# Patient Record
Sex: MALE | Race: WHITE
[De-identification: names, ages, dates, MRNs, and addresses within clinical notes are randomized per-mention and may not be internally consistent; named-entity substitution may affect disease eponyms.]

---

## 2019-02-21 PROBLEM — Z00.00 ENCOUNTER FOR PREVENTIVE HEALTH EXAMINATION: Status: ACTIVE | Noted: 2019-02-21

## 2021-02-16 ENCOUNTER — APPOINTMENT (OUTPATIENT)
Dept: PHYSICAL MEDICINE AND REHAB | Facility: CLINIC | Age: 81
End: 2021-02-16
Payer: MEDICARE

## 2021-02-16 VITALS
HEART RATE: 77 BPM | SYSTOLIC BLOOD PRESSURE: 134 MMHG | DIASTOLIC BLOOD PRESSURE: 81 MMHG | BODY MASS INDEX: 25.61 KG/M2 | HEIGHT: 64 IN | WEIGHT: 150 LBS

## 2021-02-16 DIAGNOSIS — Z80.9 FAMILY HISTORY OF MALIGNANT NEOPLASM, UNSPECIFIED: ICD-10-CM

## 2021-02-16 DIAGNOSIS — Z85.9 PERSONAL HISTORY OF MALIGNANT NEOPLASM, UNSPECIFIED: ICD-10-CM

## 2021-02-16 DIAGNOSIS — Z83.3 FAMILY HISTORY OF DIABETES MELLITUS: ICD-10-CM

## 2021-02-16 DIAGNOSIS — Z78.9 OTHER SPECIFIED HEALTH STATUS: ICD-10-CM

## 2021-02-16 PROCEDURE — 99203 OFFICE O/P NEW LOW 30 MIN: CPT

## 2021-02-16 PROCEDURE — 72100 X-RAY EXAM L-S SPINE 2/3 VWS: CPT

## 2021-02-16 RX ORDER — TAMSULOSIN HYDROCHLORIDE 0.4 MG/1
0.4 CAPSULE ORAL
Qty: 90 | Refills: 0 | Status: ACTIVE | COMMUNITY
Start: 2021-01-24

## 2021-02-16 RX ORDER — CEPHALEXIN 500 MG/1
500 CAPSULE ORAL
Qty: 28 | Refills: 0 | Status: DISCONTINUED | COMMUNITY
Start: 2020-09-15

## 2021-02-16 NOTE — DATA REVIEWED
[Plain X-Rays] : plain X-Rays [FreeTextEntry1] : X-rays (2 views) L-spine taken at today's office visit: c/w Gr. I anterolisthesis L4-5 w/ facet OA

## 2021-02-16 NOTE — ASSESSMENT
[FreeTextEntry1] : 80 y.o. 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  X-rays L-spine reviewed in office.   Advised short course of OTC NSAIDs.  Rx P.T. for modalities, gentle ROM, stretching and strengthening exercises.  May consider MRI L-spine if patient is unable to advance his rehab program.  RTC 6-8 weeks.

## 2021-02-16 NOTE — HISTORY OF PRESENT ILLNESS
[FreeTextEntry1] : 80 y.o. M w/ h/o prostate cancer (2011-12) s/p RTX presents to office w/ c/o LBP.  Sxs first began 2-3 years ago as sciatica which was helped by chiro.  Pt. describes primarily axial right sided LBP now w/o distal radiation down leg.  Denies N/T/W in leg or foot.  No recent spinal imaging.  Pt. returned to chiro.  No P.T.  No NSAIDs.  No LESI.

## 2021-02-16 NOTE — PHYSICAL EXAM
[FreeTextEntry1] : NAD\par A&Ox3\par Non-obese\par Inspection: decreased lumbar lordosis\par ROM L-spine: 1/2 full forward flexion; 10' passive extension\par ROM Hips: smooth IR/ER w/o pain\par Pelvic tilt: slight\par Seated slump test: neg\par SLR: neg\par SHAUNA's: neg\par DTR's: symmetric knees; absent ankles\par MMT: 5/5 b/l LE\par Sensation: SILT\par Toe & Heel Walk: Yes\par Palpation: no TPs\par

## 2021-04-20 ENCOUNTER — APPOINTMENT (OUTPATIENT)
Dept: PHYSICAL MEDICINE AND REHAB | Facility: CLINIC | Age: 81
End: 2021-04-20
Payer: MEDICARE

## 2021-04-20 VITALS
HEIGHT: 64 IN | DIASTOLIC BLOOD PRESSURE: 80 MMHG | RESPIRATION RATE: 14 BRPM | WEIGHT: 150 LBS | TEMPERATURE: 97 F | SYSTOLIC BLOOD PRESSURE: 139 MMHG | BODY MASS INDEX: 25.61 KG/M2 | HEART RATE: 64 BPM | OXYGEN SATURATION: 99 %

## 2021-04-20 DIAGNOSIS — G89.29 LOW BACK PAIN: ICD-10-CM

## 2021-04-20 DIAGNOSIS — M54.5 LOW BACK PAIN: ICD-10-CM

## 2021-04-20 PROCEDURE — 99213 OFFICE O/P EST LOW 20 MIN: CPT

## 2021-04-20 NOTE — ASSESSMENT
[FreeTextEntry1] : 80 y.o. M w/ acute exacerbation of lumbar DDD/spondylosis (L4-5 spondy -> likely stenosis) w/o prominent radicular or myelopathic sxs - clinically improved.  X-rays L-spine reviewed in office again w/ patient.   No need for MRI now.  Advised adherence w/ HEP.   RTC PRN.

## 2021-04-20 NOTE — HISTORY OF PRESENT ILLNESS
[FreeTextEntry1] : 80 y.o. M w/ acute exacerbation of lumbar DDD/spondylosis (L4-5 spondy -> likely stenosis) w/o prominent radicular or myelopathic sxs returns to office for f/u.  Pt. states that his LBP has settled down and has not bothered him since.  He walks ad shola w/o AD.  Pt. finished his course of P.T. and is compliant with his HEP.  Not taking any NSAIDs.

## 2022-10-15 ENCOUNTER — EMERGENCY (EMERGENCY)
Facility: HOSPITAL | Age: 82
LOS: 0 days | Discharge: HOME | End: 2022-10-15
Attending: STUDENT IN AN ORGANIZED HEALTH CARE EDUCATION/TRAINING PROGRAM
Payer: MEDICARE

## 2022-10-15 VITALS
DIASTOLIC BLOOD PRESSURE: 78 MMHG | TEMPERATURE: 98 F | RESPIRATION RATE: 18 BRPM | OXYGEN SATURATION: 97 % | WEIGHT: 149.91 LBS | HEART RATE: 71 BPM | HEIGHT: 64 IN | SYSTOLIC BLOOD PRESSURE: 123 MMHG
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
## 2022-10-15 DIAGNOSIS — S09.90XA UNSPECIFIED INJURY OF HEAD, INITIAL ENCOUNTER: ICD-10-CM
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
## 2022-10-15 DIAGNOSIS — Z23 ENCOUNTER FOR IMMUNIZATION: ICD-10-CM
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
## 2022-10-15 DIAGNOSIS — S00.91XA ABRASION OF UNSPECIFIED PART OF HEAD, INITIAL ENCOUNTER: ICD-10-CM

## 2022-10-15 PROCEDURE — 72125 CT NECK SPINE W/O DYE: CPT | Mod: MA

## 2022-10-15 PROCEDURE — 99285 EMERGENCY DEPT VISIT HI MDM: CPT | Mod: 25

## 2022-10-15 PROCEDURE — 93005 ELECTROCARDIOGRAM TRACING: CPT

## 2022-10-15 PROCEDURE — 99284 EMERGENCY DEPT VISIT MOD MDM: CPT

## 2022-10-15 PROCEDURE — 70450 CT HEAD/BRAIN W/O DYE: CPT | Mod: 26,MA

## 2022-10-15 PROCEDURE — 90471 IMMUNIZATION ADMIN: CPT

## 2022-10-15 PROCEDURE — 82962 GLUCOSE BLOOD TEST: CPT

## 2022-10-15 PROCEDURE — 70450 CT HEAD/BRAIN W/O DYE: CPT | Mod: MA

## 2022-10-15 PROCEDURE — 90715 TDAP VACCINE 7 YRS/> IM: CPT

## 2022-10-15 PROCEDURE — 93010 ELECTROCARDIOGRAM REPORT: CPT

## 2022-10-15 PROCEDURE — 72125 CT NECK SPINE W/O DYE: CPT | Mod: 26,MA

## 2022-10-15 RX ORDER — TETANUS TOXOID, REDUCED DIPHTHERIA TOXOID AND ACELLULAR PERTUSSIS VACCINE, ADSORBED 5; 2.5; 8; 8; 2.5 [IU]/.5ML; [IU]/.5ML; UG/.5ML; UG/.5ML; UG/.5ML
0.5 SUSPENSION INTRAMUSCULAR ONCE
Refills: 0 | Status: COMPLETED | OUTPATIENT
Start: 2022-10-15 | End: 2022-10-15

## 2022-10-15 RX ADMIN — TETANUS TOXOID, REDUCED DIPHTHERIA TOXOID AND ACELLULAR PERTUSSIS VACCINE, ADSORBED 0.5 MILLILITER(S): 5; 2.5; 8; 8; 2.5 SUSPENSION INTRAMUSCULAR at 17:37

## 2022-10-15 NOTE — ED PROVIDER NOTE - PROGRESS NOTE DETAILS
Mavis DO: wound cleansed; patient ambulated with steady gait; instructed to f/u with pmd; strict return precautions given.

## 2022-10-15 NOTE — ED ADULT TRIAGE NOTE - CHIEF COMPLAINT QUOTE
Pt BIBEMS s/p fall. Pt was playing pickle ball when he fell backwards hitting his head on the ground. Pt noted to have an abrasion to the back of his head. C Collar placed by EMS. Pt has no complaints at this time. Unsure LOC at the time of the event. Pt denies blood thinner use. NA called.

## 2022-10-15 NOTE — ED ADULT NURSE NOTE - OBJECTIVE STATEMENT
Patient presents to the emergency room with complaints of fall. Patient stated he was playing pickle ball when he fell backwards hitting his head on the ground. Patient with abrasion to the back of his head. C Collar placed by EMS. Patient awake, alert, denies pain, denies blood thinner use, unknown LOC at time of fall.

## 2022-10-15 NOTE — ED PROVIDER NOTE - OBJECTIVE STATEMENT
82 year old male presents to the ED BIBEMS as a neuro alert s/p fall. Patient was playing pickle ball, lost balance and fell backwards, hitting head on the ground. Pt has not ambulated since fall. + abrasion on back of head, unsure of LOC. Denies AC use. Denies dizziness/lightheadedness prior to fall or currently. Denies back pain, chest pain, leg pain, or any other complaints. 82 year old male presents to the ED BIBEMS s/p fall. Patient was playing pickle ball, lost balance and fell backwards, hitting head on the ground. Pt has not ambulated since fall. + abrasion on back of head, unsure of LOC. Denies AC use. Denies dizziness/lightheadedness prior to fall or currently. Denies back pain, chest pain, leg pain, or any other complaints.

## 2022-10-15 NOTE — ED PROVIDER NOTE - CLINICAL SUMMARY MEDICAL DECISION MAKING FREE TEXT BOX
82 year old male with closed head injury s/p mechanical fall. Plan: CT imaging, wound care, update Tetanus, and reevaluate.

## 2022-10-15 NOTE — ED PROVIDER NOTE - PATIENT PORTAL LINK FT
You can access the FollowMyHealth Patient Portal offered by [facility] by registering at the following website: [email or web address]. By joining [facility]’s FollowMyHealth portal, you will also be able to view your health information using other applications (apps) compatible with our system.

## 2024-04-01 NOTE — ED ADULT NURSE NOTE - HISTORY OF COVID-19 VACCINATION
No record of fax sent March 2024. Called Ju to request a re-send, had to leave detailed message. If she calls back please advise her form lost and we require another. Thank you.    Vaccine status unknown